# Patient Record
Sex: FEMALE | Race: WHITE | Employment: FULL TIME | URBAN - METROPOLITAN AREA
[De-identification: names, ages, dates, MRNs, and addresses within clinical notes are randomized per-mention and may not be internally consistent; named-entity substitution may affect disease eponyms.]

---

## 2019-06-05 ENCOUNTER — HOSPITAL ENCOUNTER (OUTPATIENT)
Dept: GENERAL RADIOLOGY | Age: 29
Discharge: HOME | End: 2019-06-05
Payer: COMMERCIAL

## 2019-06-05 DIAGNOSIS — R61 GENERALIZED HYPERHIDROSIS: ICD-10-CM

## 2019-06-05 DIAGNOSIS — R05.9 COUGH: ICD-10-CM

## 2019-06-05 PROCEDURE — 71046 X-RAY EXAM CHEST 2 VIEWS: CPT

## 2019-06-11 ENCOUNTER — HOSPITAL ENCOUNTER (EMERGENCY)
Age: 29
Discharge: HOME | End: 2019-06-11
Attending: EMERGENCY MEDICINE
Payer: COMMERCIAL

## 2019-06-11 VITALS
SYSTOLIC BLOOD PRESSURE: 134 MMHG | DIASTOLIC BLOOD PRESSURE: 93 MMHG | HEIGHT: 69 IN | BODY MASS INDEX: 29.62 KG/M2 | HEART RATE: 65 BPM | TEMPERATURE: 98 F | WEIGHT: 200 LBS | OXYGEN SATURATION: 98 % | RESPIRATION RATE: 16 BRPM

## 2019-06-11 DIAGNOSIS — R11.2 NON-INTRACTABLE VOMITING WITH NAUSEA, UNSPECIFIED VOMITING TYPE: Primary | ICD-10-CM

## 2019-06-11 LAB
ALBUMIN SERPL-MCNC: 4.1 G/DL (ref 3.4–5)
ALBUMIN/GLOB SERPL: 1.1 {RATIO} (ref 0.8–1.7)
ALP SERPL-CCNC: 55 U/L (ref 45–117)
ALT SERPL-CCNC: 22 U/L (ref 13–56)
AMORPH CRY URNS QL MICRO: ABNORMAL
ANION GAP SERPL CALC-SCNC: 6 MMOL/L (ref 3–18)
APPEARANCE UR: ABNORMAL
AST SERPL-CCNC: 28 U/L (ref 15–37)
BACTERIA URNS QL MICRO: ABNORMAL /HPF
BASOPHILS # BLD: 0 K/UL (ref 0–0.1)
BASOPHILS NFR BLD: 0 % (ref 0–2)
BILIRUB SERPL-MCNC: 1 MG/DL (ref 0.2–1)
BILIRUB UR QL: NEGATIVE
BUN SERPL-MCNC: 22 MG/DL (ref 7–18)
BUN/CREAT SERPL: 26 (ref 12–20)
CALCIUM SERPL-MCNC: 9.4 MG/DL (ref 8.5–10.1)
CHLORIDE SERPL-SCNC: 104 MMOL/L (ref 100–108)
CO2 SERPL-SCNC: 28 MMOL/L (ref 21–32)
COLOR UR: ABNORMAL
CREAT SERPL-MCNC: 0.85 MG/DL (ref 0.6–1.3)
DIFFERENTIAL METHOD BLD: ABNORMAL
EOSINOPHIL # BLD: 0.2 K/UL (ref 0–0.4)
EOSINOPHIL NFR BLD: 3 % (ref 0–5)
EPITH CASTS URNS QL MICRO: ABNORMAL /LPF (ref 0–5)
ERYTHROCYTE [DISTWIDTH] IN BLOOD BY AUTOMATED COUNT: 12.3 % (ref 11.6–14.5)
GLOBULIN SER CALC-MCNC: 3.7 G/DL (ref 2–4)
GLUCOSE SERPL-MCNC: 97 MG/DL (ref 74–99)
GLUCOSE UR STRIP.AUTO-MCNC: NEGATIVE MG/DL
HCG UR QL: NEGATIVE
HCT VFR BLD AUTO: 41.1 % (ref 35–45)
HGB BLD-MCNC: 13.8 G/DL (ref 12–16)
HGB UR QL STRIP: ABNORMAL
KETONES UR QL STRIP.AUTO: ABNORMAL MG/DL
LEUKOCYTE ESTERASE UR QL STRIP.AUTO: ABNORMAL
LIPASE SERPL-CCNC: 112 U/L (ref 73–393)
LYMPHOCYTES # BLD: 1.3 K/UL (ref 0.9–3.6)
LYMPHOCYTES NFR BLD: 17 % (ref 21–52)
MCH RBC QN AUTO: 30.5 PG (ref 24–34)
MCHC RBC AUTO-ENTMCNC: 33.6 G/DL (ref 31–37)
MCV RBC AUTO: 90.7 FL (ref 74–97)
MONOCYTES # BLD: 1 K/UL (ref 0.05–1.2)
MONOCYTES NFR BLD: 12 % (ref 3–10)
NEUTS SEG # BLD: 5.4 K/UL (ref 1.8–8)
NEUTS SEG NFR BLD: 68 % (ref 40–73)
NITRITE UR QL STRIP.AUTO: NEGATIVE
PH UR STRIP: 5.5 [PH] (ref 5–8)
PLATELET # BLD AUTO: 255 K/UL (ref 135–420)
PMV BLD AUTO: 10 FL (ref 9.2–11.8)
POTASSIUM SERPL-SCNC: 4.4 MMOL/L (ref 3.5–5.5)
PROT SERPL-MCNC: 7.8 G/DL (ref 6.4–8.2)
PROT UR STRIP-MCNC: ABNORMAL MG/DL
RBC # BLD AUTO: 4.53 M/UL (ref 4.2–5.3)
RBC #/AREA URNS HPF: ABNORMAL /HPF (ref 0–5)
SODIUM SERPL-SCNC: 138 MMOL/L (ref 136–145)
SP GR UR REFRACTOMETRY: >1.03 (ref 1–1.03)
UROBILINOGEN UR QL STRIP.AUTO: 1 EU/DL (ref 0.2–1)
WBC # BLD AUTO: 8 K/UL (ref 4.6–13.2)
WBC URNS QL MICRO: ABNORMAL /HPF (ref 0–4)

## 2019-06-11 PROCEDURE — 99283 EMERGENCY DEPT VISIT LOW MDM: CPT

## 2019-06-11 PROCEDURE — 74011250636 HC RX REV CODE- 250/636: Performed by: STUDENT IN AN ORGANIZED HEALTH CARE EDUCATION/TRAINING PROGRAM

## 2019-06-11 PROCEDURE — 81025 URINE PREGNANCY TEST: CPT

## 2019-06-11 PROCEDURE — 87086 URINE CULTURE/COLONY COUNT: CPT

## 2019-06-11 PROCEDURE — 80053 COMPREHEN METABOLIC PANEL: CPT

## 2019-06-11 PROCEDURE — 96361 HYDRATE IV INFUSION ADD-ON: CPT

## 2019-06-11 PROCEDURE — 74011250636 HC RX REV CODE- 250/636: Performed by: EMERGENCY MEDICINE

## 2019-06-11 PROCEDURE — 96374 THER/PROPH/DIAG INJ IV PUSH: CPT

## 2019-06-11 PROCEDURE — 85025 COMPLETE CBC W/AUTO DIFF WBC: CPT

## 2019-06-11 PROCEDURE — 81001 URINALYSIS AUTO W/SCOPE: CPT

## 2019-06-11 PROCEDURE — 83690 ASSAY OF LIPASE: CPT

## 2019-06-11 RX ORDER — ONDANSETRON 4 MG/1
4 TABLET, ORALLY DISINTEGRATING ORAL
Qty: 20 TAB | Refills: 0 | Status: SHIPPED | OUTPATIENT
Start: 2019-06-11

## 2019-06-11 RX ORDER — ONDANSETRON 2 MG/ML
4 INJECTION INTRAMUSCULAR; INTRAVENOUS ONCE
Status: COMPLETED | OUTPATIENT
Start: 2019-06-11 | End: 2019-06-11

## 2019-06-11 RX ORDER — OMEPRAZOLE 20 MG/1
20 CAPSULE, DELAYED RELEASE ORAL DAILY
COMMUNITY

## 2019-06-11 RX ADMIN — SODIUM CHLORIDE 1000 ML: 900 INJECTION, SOLUTION INTRAVENOUS at 04:51

## 2019-06-11 RX ADMIN — ONDANSETRON HYDROCHLORIDE 4 MG: 2 SOLUTION INTRAMUSCULAR; INTRAVENOUS at 04:51

## 2019-06-11 NOTE — ED NOTES
I have reviewed discharge instructions with the patient. The patient verbalized understanding. Discharge medications reviewed with patient and appropriate educational materials and side effects teaching were provided. Pt is AxOx4, NAD. Pt received written discharge instructions. Pt ambulated out of ED with steady gait.

## 2019-06-11 NOTE — DISCHARGE INSTRUCTIONS
Patient Education        Nausea and Vomiting: Care Instructions  Your Care Instructions    When you are nauseated, you may feel weak and sweaty and notice a lot of saliva in your mouth. Nausea often leads to vomiting. Most of the time you do not need to worry about nausea and vomiting, but they can be signs of other illnesses. Two common causes of nausea and vomiting are stomach flu and food poisoning. Nausea and vomiting from viral stomach flu will usually start to improve within 24 hours. Nausea and vomiting from food poisoning may last from 12 to 48 hours. The doctor has checked you carefully, but problems can develop later. If you notice any problems or new symptoms, get medical treatment right away. Follow-up care is a key part of your treatment and safety. Be sure to make and go to all appointments, and call your doctor if you are having problems. It's also a good idea to know your test results and keep a list of the medicines you take. How can you care for yourself at home? · To prevent dehydration, drink plenty of fluids, enough so that your urine is light yellow or clear like water. Choose water and other caffeine-free clear liquids until you feel better. If you have kidney, heart, or liver disease and have to limit fluids, talk with your doctor before you increase the amount of fluids you drink. · Rest in bed until you feel better. · When you are able to eat, try clear soups, mild foods, and liquids until all symptoms are gone for 12 to 48 hours. Other good choices include dry toast, crackers, cooked cereal, and gelatin dessert, such as Jell-O. When should you call for help? Call 911 anytime you think you may need emergency care. For example, call if:    · You passed out (lost consciousness).    Call your doctor now or seek immediate medical care if:    · You have symptoms of dehydration, such as:  ? Dry eyes and a dry mouth. ? Passing only a little dark urine. ?  Feeling thirstier than usual.   · You have new or worsening belly pain.     · You have a new or higher fever.     · You vomit blood or what looks like coffee grounds.    Watch closely for changes in your health, and be sure to contact your doctor if:    · You have ongoing nausea and vomiting.     · Your vomiting is getting worse.     · Your vomiting lasts longer than 2 days.     · You are not getting better as expected. Where can you learn more? Go to http://don-anton.info/. Enter 25 [id number] in the search box to learn more about \"Nausea and Vomiting: Care Instructions. \"  Current as of: September 23, 2018  Content Version: 11.9  © 2066-0154 [employer]. Care instructions adapted under license by [employer] (which disclaims liability or warranty for this information). If you have questions about a medical condition or this instruction, always ask your healthcare professional. [address] any warranty or liability for your use of this information.

## 2019-06-11 NOTE — ED TRIAGE NOTES
Pt states she has had abdominal pain N/V/D since Saturday, states she is trying to get in with a GI doctor but has been unsuccessful, states she has lost 8 pounds due to inabilty to keep food down.

## 2019-06-11 NOTE — ED PROVIDER NOTES
EMERGENCY DEPARTMENT HISTORY AND PHYSICAL EXAM    4:27 AM    Date: 6/11/2019  Patient Name: Carmelo Soliz    History of Presenting Illness     Chief Complaint   Patient presents with    Vomiting    Abdominal Pain    Diarrhea       History Provided By:     Additional History (Context): Carmelo Soliz is a 30 y.o. female with PMH of GERD recently seen by PCP for 1 month of diarrhea here with N/V and diarrhea since Saturday 3 days ago. Patient reports she has been unable to hold anything down - she denies fevers, blood in emesis, or rash. She has associated abd pain in her LUQ and epigastric area. She has not taken any medication for this N/V. She has tried immodium for the diarrhea with limited results. She denies active etoh or drug use, prior injectable drugs. She reports remote hx of STI when she was 14yo that was appropriately treated. She denies any vaginal discharge, bleeding, or lower pelvic pain. She denies any CP, SOB, wheezing. \  She denies current sexual activity - she has not had her period this month. She recently met her new PCP -7 days ago who recommended a GI referral.     PCP: Juan Jsoé Bahena NP    Current Facility-Administered Medications   Medication Dose Route Frequency Provider Last Rate Last Dose    sodium chloride 0.9 % bolus infusion 1,000 mL  1,000 mL IntraVENous ONCE Federico Paiz MD 1,000 mL/hr at 06/11/19 0451 1,000 mL at 06/11/19 0451     Current Outpatient Medications   Medication Sig Dispense Refill    omeprazole (PRILOSEC) 20 mg capsule Take 20 mg by mouth daily. Past History     Past Medical History:  Past Medical History:   Diagnosis Date    Endometriosis     GERD (gastroesophageal reflux disease)     Migraines     Tubal ligation evaluation 2012       Past Surgical History:  History reviewed. No pertinent surgical history. Family History:  History reviewed. No pertinent family history.     Social History:  Social History     Tobacco Use    Smoking status: Current Every Day Smoker     Packs/day: 0.50   Substance Use Topics    Alcohol use: Not Currently    Drug use: Not Currently       Allergies: Allergies   Allergen Reactions    Amoxicillin Angioedema    Keflex [Cephalexin] Rash    Compazine [Prochlorperazine] Anxiety    Geodon [Ziprasidone Hcl] Other (comments)     Dystonia           Review of Systems       Review of Systems   Constitutional: Positive for chills and diaphoresis. Negative for fatigue and fever. HENT: Negative for congestion and sinus pain. Respiratory: Negative for cough, chest tightness and shortness of breath. Cardiovascular: Negative for chest pain, palpitations and leg swelling. Gastrointestinal: Positive for abdominal pain, diarrhea, nausea and vomiting. Negative for blood in stool, constipation and rectal pain. Genitourinary: Positive for difficulty urinating (feels dehydrated). Negative for dysuria, flank pain, frequency, genital sores, menstrual problem and urgency. Musculoskeletal: Negative for back pain. Skin: Negative for color change, pallor and rash. Neurological: Negative for dizziness, weakness, light-headedness, numbness and headaches. Physical Exam     Visit Vitals  BP (!) 134/93   Pulse 65   Temp 98 °F (36.7 °C)   Resp 16   Ht 5' 9\" (1.753 m)   Wt 90.7 kg (200 lb)   SpO2 98%   BMI 29.53 kg/m²       Physical Exam   Constitutional: She is oriented to person, place, and time. She appears well-developed and well-nourished. No distress. HENT:   Head: Normocephalic and atraumatic. Mouth/Throat: No oropharyngeal exudate. Eyes: Pupils are equal, round, and reactive to light. EOM are normal. Right eye exhibits no discharge. Left eye exhibits no discharge. Neck: Normal range of motion. Cardiovascular: Normal rate, regular rhythm, normal heart sounds and intact distal pulses. Pulmonary/Chest: Effort normal and breath sounds normal. No respiratory distress. She has no wheezes.  She exhibits no tenderness. Abdominal: Soft. She exhibits distension (mild obesity). She exhibits no mass. There is tenderness (mild LUQ/Epigastric area). There is no rebound and no guarding. Musculoskeletal: Normal range of motion. She exhibits no edema or tenderness. Lymphadenopathy:     She has no cervical adenopathy. Neurological: She is alert and oriented to person, place, and time. Skin: Skin is warm and dry. No rash noted. She is not diaphoretic. No erythema. No pallor. Nursing note and vitals reviewed. Diagnostic Study Results     Labs -  Recent Results (from the past 12 hour(s))   URINALYSIS W/ RFLX MICROSCOPIC    Collection Time: 06/11/19  4:04 AM   Result Value Ref Range    Color DARK YELLOW      Appearance TURBID      Specific gravity >1.030 (H) 1.005 - 1.030    pH (UA) 5.5 5.0 - 8.0      Protein TRACE (A) NEG mg/dL    Glucose NEGATIVE  NEG mg/dL    Ketone TRACE (A) NEG mg/dL    Bilirubin NEGATIVE  NEG      Blood MODERATE (A) NEG      Urobilinogen 1.0 0.2 - 1.0 EU/dL    Nitrites NEGATIVE  NEG      Leukocyte Esterase MODERATE (A) NEG     HCG URINE, QL    Collection Time: 06/11/19  4:04 AM   Result Value Ref Range    HCG urine, QL NEGATIVE  NEG     CBC WITH AUTOMATED DIFF    Collection Time: 06/11/19  4:25 AM   Result Value Ref Range    WBC 8.0 4.6 - 13.2 K/uL    RBC 4.53 4.20 - 5.30 M/uL    HGB 13.8 12.0 - 16.0 g/dL    HCT 41.1 35.0 - 45.0 %    MCV 90.7 74.0 - 97.0 FL    MCH 30.5 24.0 - 34.0 PG    MCHC 33.6 31.0 - 37.0 g/dL    RDW 12.3 11.6 - 14.5 %    PLATELET 722 805 - 864 K/uL    MPV 10.0 9.2 - 11.8 FL    NEUTROPHILS 68 40 - 73 %    LYMPHOCYTES 17 (L) 21 - 52 %    MONOCYTES 12 (H) 3 - 10 %    EOSINOPHILS 3 0 - 5 %    BASOPHILS 0 0 - 2 %    ABS. NEUTROPHILS 5.4 1.8 - 8.0 K/UL    ABS. LYMPHOCYTES 1.3 0.9 - 3.6 K/UL    ABS. MONOCYTES 1.0 0.05 - 1.2 K/UL    ABS. EOSINOPHILS 0.2 0.0 - 0.4 K/UL    ABS.  BASOPHILS 0.0 0.0 - 0.1 K/UL    DF AUTOMATED     METABOLIC PANEL, COMPREHENSIVE    Collection Time: 06/11/19  4:25 AM   Result Value Ref Range    Sodium 138 136 - 145 mmol/L    Potassium 4.4 3.5 - 5.5 mmol/L    Chloride 104 100 - 108 mmol/L    CO2 28 21 - 32 mmol/L    Anion gap 6 3.0 - 18 mmol/L    Glucose 97 74 - 99 mg/dL    BUN 22 (H) 7.0 - 18 MG/DL    Creatinine 0.85 0.6 - 1.3 MG/DL    BUN/Creatinine ratio 26 (H) 12 - 20      GFR est AA >60 >60 ml/min/1.73m2    GFR est non-AA >60 >60 ml/min/1.73m2    Calcium 9.4 8.5 - 10.1 MG/DL    Bilirubin, total 1.0 0.2 - 1.0 MG/DL    ALT (SGPT) 22 13 - 56 U/L    AST (SGOT) 28 15 - 37 U/L    Alk. phosphatase 55 45 - 117 U/L    Protein, total 7.8 6.4 - 8.2 g/dL    Albumin 4.1 3.4 - 5.0 g/dL    Globulin 3.7 2.0 - 4.0 g/dL    A-G Ratio 1.1 0.8 - 1.7     LIPASE    Collection Time: 06/11/19  4:25 AM   Result Value Ref Range    Lipase 112 73 - 393 U/L       Radiologic Studies -   No orders to display         Medical Decision Making   I am the first provider for this patient. I reviewed the vital signs, available nursing notes, past medical history, past surgical history, family history and social history. Vital Signs-Reviewed the patient's vital signs.       ED Course: Progress Notes, Reevaluation, and Consults:  ED Course as of Jun 11 0538 Tue Jun 11, 2019   0440 Pulse (Heart Rate): 65 [GW]   0440 Not tachycardic, afebrile   Temp: 98 °F (36.7 °C) [GW]   0514 Does not suggest DKA   Anion gap: 6 [GW]   0515 Not elevated to suggest infection     WBC: 8.0 [GW]   0515 Moderate LE and trace ketones    [GW]   0515 Elevated to suggest pre-renal - likely dehydrated   BUN/Creatinine ratio(!): 26 [GW]   0516 With high SG suggest dehydration     Specific gravity(!): >1.030 [GW]   0516 LFTs, lipase do not suggest hepatobiliary, pancreatic cause of pain    [GW]   0534 Taking PO now and feels like the need to vomit is gone s/p zofran   -reporting now that her sister's house recently had GI bug  -low suspicion for more serious bacterial infection or IBD at this time given exam, vitals, hx [GW]      ED Course User Index  [GW] Ted Calderon MD     Patient feels better s/p fluids and meds and desires to go home. Will d/c with Rx for zofran. Provider Notes (Medical Decision Making):     Case discussed with attending physician, Dr Alysha Garrido    Diagnosis     Clinical Impression:   1. Non-intractable vomiting with nausea, unspecified vomiting type        Disposition: Home    Follow-up Information    None          Patient's Medications   Start Taking    No medications on file   Continue Taking    OMEPRAZOLE (PRILOSEC) 20 MG CAPSULE    Take 20 mg by mouth daily.    These Medications have changed    No medications on file   Stop Taking    No medications on file       Oneida Cole MD, PGY-2   [facility] - Emergency Medicine   June 11, 2019, 4:27 AM

## 2019-06-12 LAB
BACTERIA SPEC CULT: NORMAL
SERVICE CMNT-IMP: NORMAL

## 2020-08-16 ENCOUNTER — HOSPITAL ENCOUNTER (EMERGENCY)
Age: 30
Discharge: HOME | End: 2020-08-16
Attending: EMERGENCY MEDICINE
Payer: COMMERCIAL

## 2020-08-16 ENCOUNTER — APPOINTMENT (OUTPATIENT)
Dept: GENERAL RADIOLOGY | Age: 30
End: 2020-08-16
Attending: EMERGENCY MEDICINE
Payer: COMMERCIAL

## 2020-08-16 VITALS
HEIGHT: 69 IN | RESPIRATION RATE: 18 BRPM | HEART RATE: 76 BPM | SYSTOLIC BLOOD PRESSURE: 139 MMHG | WEIGHT: 235 LBS | BODY MASS INDEX: 34.8 KG/M2 | TEMPERATURE: 98.9 F | OXYGEN SATURATION: 99 % | DIASTOLIC BLOOD PRESSURE: 97 MMHG

## 2020-08-16 DIAGNOSIS — Z20.822 SUSPECTED COVID-19 VIRUS INFECTION: Primary | ICD-10-CM

## 2020-08-16 PROCEDURE — 87635 SARS-COV-2 COVID-19 AMP PRB: CPT

## 2020-08-16 PROCEDURE — 71045 X-RAY EXAM CHEST 1 VIEW: CPT

## 2020-08-16 PROCEDURE — 99283 EMERGENCY DEPT VISIT LOW MDM: CPT

## 2020-08-16 NOTE — LETTER
NOTIFICATION RETURN TO WORK / SCHOOL 
 
8/16/2020 4:27 AM 
 
Ms. Nury Justice [address] [facility] [id number] To Whom It May Concern: 
 
Nury Justice is currently under the care of [facility]. She will return to work/school on: 8/20/20 If there are questions or concerns please have the patient contact our office. Sincerely, MISTI Ferro

## 2020-08-16 NOTE — ED TRIAGE NOTES
Pt arrived with complaint of sinus pain and runny nose. States it feel like something is stuck in the back of her nose. PT also report sore throat with cough.

## 2020-08-16 NOTE — ED NOTES
2020 08:10 I have reviewed discharge instructions with the patient. The patient verbalized understanding. Patient armband removed and given to patient to take home.   Patient was informed of the privacy risks if armband lost or stolen

## 2020-08-16 NOTE — ED PROVIDER NOTES
Savanna Olvera is a 26 y.o. female with a past medical history of endometriosis coming in complaining of nasal congestion, nonproductive cough, sore throat, and fever. Patient states that symptoms are about 2 days ago. She states she had a fever up to 100.9 at home earlier tonight. Denies any significant shortness of breath, but does report fatigue and malaise. Also reports body aches and chills. Denies chest pain or hemoptysis. Denies leg swelling or history of DVT/PE. Patient denies known sick contacts. No other complaints this time. Past Medical History:   Diagnosis Date    Endometriosis     GERD (gastroesophageal reflux disease)     Migraines     Tubal ligation evaluation 2012       No past surgical history on file. No family history on file.     Social History     Socioeconomic History    Marital status: SINGLE     Spouse name: Not on file    Number of children: Not on file    Years of education: Not on file    Highest education level: Not on file   Occupational History    Not on file   Social Needs    Financial resource strain: Not on file    Food insecurity     Worry: Not on file     Inability: Not on file    Transportation needs     Medical: Not on file     Non-medical: Not on file   Tobacco Use    Smoking status: Current Every Day Smoker     Packs/day: 0.50   Substance and Sexual Activity    Alcohol use: Not Currently    Drug use: Not Currently    Sexual activity: Not on file   Lifestyle    Physical activity     Days per week: Not on file     Minutes per session: Not on file    Stress: Not on file   Relationships    Social connections     Talks on phone: Not on file     Gets together: Not on file     Attends [employer] service: Not on file     Active member of club or organization: Not on file     Attends meetings of clubs or organizations: Not on file     Relationship status: Not on file    Intimate partner violence     Fear of current or ex partner: Not on file Emotionally abused: Not on file     Physically abused: Not on file     Forced sexual activity: Not on file   Other Topics Concern    Not on file   Social History Narrative    Not on file         ALLERGIES: Amoxicillin; Keflex [cephalexin]; Compazine [prochlorperazine]; and Geodon [ziprasidone hcl]    Review of Systems   Constitutional: Positive for chills, fatigue and fever. Respiratory: Positive for cough. Negative for shortness of breath. Cardiovascular: Negative. Negative for chest pain and leg swelling. Gastrointestinal: Negative. Negative for abdominal pain, nausea and vomiting. Musculoskeletal: Positive for myalgias. Skin: Negative. Negative for rash. Neurological: Negative. Negative for dizziness, weakness and light-headedness. All other systems reviewed and are negative. Vitals:    08/16/20 0236   BP: 143/84   Pulse: (!) 107   Resp: 20   Temp: 98.9 °F (37.2 °C)   SpO2: 96%   Weight: 106.6 kg (235 lb)   Height: 5' 9\" (1.753 m)            Physical Exam  Vitals signs reviewed. Constitutional:       General: She is not in acute distress. Appearance: Normal appearance. She is well-developed. HENT:      Head: Normocephalic and atraumatic. Nose: Congestion present. Mouth/Throat:      Comments: Normal phonation, no drooling, handling secretions without difficulty. No hoarseness. Eyes:      Extraocular Movements: Extraocular movements intact. Conjunctiva/sclera: Conjunctivae normal.      Pupils: Pupils are equal, round, and reactive to light. Neck:      Musculoskeletal: Normal range of motion and neck supple. Cardiovascular:      Rate and Rhythm: Normal rate and regular rhythm. Heart sounds: S1 normal and S2 normal. No murmur. No friction rub. No gallop. Pulmonary:      Effort: Pulmonary effort is normal. No accessory muscle usage or respiratory distress. Breath sounds: Normal breath sounds.       Comments: Coughs frequently during exam.  Patient speaking in full sentences. Lungs clear to auscultation. No accessory muscle use or increased work of breathing. Abdominal:      General: There is no distension. Tenderness: There is no abdominal tenderness. Musculoskeletal: Normal range of motion. General: No tenderness. Skin:     General: Skin is warm. Findings: No rash. Neurological:      General: No focal deficit present. Mental Status: She is alert and oriented to person, place, and time. Psychiatric:         Speech: Speech normal.          MDM  Number of Diagnoses or Management Options  Suspected COVID-19 virus infection:   Diagnosis management comments: Slava Ponce is a 26 y.o. female coming in with fever, cough, myalgias, chills, and fatigue. Also with a sore throat nasal congestion. Differential diagnosis includes COVID-19, bronchitis, committee acquired pneumonia, viral URI, allergic rhinitis, among others. Will test for COVID and get a chest x-ray. Patient is very well-appearing and nontoxic. Lungs clear with good O2 sats and respiratory effort. We will plan on outpatient treatment with isolation at home. Patient was given return precautions for worsening symptoms, significant shortness of breath at rest, severe chest pain, or other concerns. Procedures      Vitals:  Patient Vitals for the past 12 hrs:   Temp Pulse Resp BP SpO2   08/16/20 0236 98.9 °F (37.2 °C) (!) 107 20 143/84 96 %       Disposition:  Diagnosis:   1.  Suspected COVID-19 virus infection        Disposition: Discharge    Follow-up Information     Follow up With Specialties Details Why Contact Info    Duke Dickson MD Family Medicine Schedule an appointment as soon as possible for a visit for office follow up [address] [place] [address] [address]      [facility] EM[facility] Emergency Medicine  If symptoms worsen [address] [id number] [phone]           Patient's Medications   Start Taking No medications on file   Continue Taking    OMEPRAZOLE (PRILOSEC) 20 MG CAPSULE    Take 20 mg by mouth daily. ONDANSETRON (ZOFRAN ODT) 4 MG DISINTEGRATING TABLET    Take 1 Tab by mouth every eight (8) hours as needed for Nausea.    These Medications have changed    No medications on file   Stop Taking    No medications on file

## 2020-08-17 LAB — SARS-COV-2, COV2NT: NOT DETECTED

## 2020-08-18 ENCOUNTER — HOSPITAL ENCOUNTER (EMERGENCY)
Age: 30
Discharge: HOME | End: 2020-08-18
Attending: EMERGENCY MEDICINE
Payer: COMMERCIAL

## 2020-08-18 ENCOUNTER — APPOINTMENT (OUTPATIENT)
Dept: GENERAL RADIOLOGY | Age: 30
End: 2020-08-18
Attending: PHYSICIAN ASSISTANT
Payer: COMMERCIAL

## 2020-08-18 VITALS
BODY MASS INDEX: 34.8 KG/M2 | RESPIRATION RATE: 18 BRPM | TEMPERATURE: 98.6 F | DIASTOLIC BLOOD PRESSURE: 79 MMHG | HEIGHT: 69 IN | OXYGEN SATURATION: 96 % | HEART RATE: 69 BPM | WEIGHT: 235 LBS | SYSTOLIC BLOOD PRESSURE: 118 MMHG

## 2020-08-18 DIAGNOSIS — J20.8 VIRAL BRONCHITIS: Primary | ICD-10-CM

## 2020-08-18 PROCEDURE — 71045 X-RAY EXAM CHEST 1 VIEW: CPT

## 2020-08-18 PROCEDURE — 99281 EMR DPT VST MAYX REQ PHY/QHP: CPT

## 2020-08-18 RX ORDER — ALBUTEROL SULFATE 1.25 MG/3ML
2.5 SOLUTION RESPIRATORY (INHALATION)
Qty: 25 EACH | Refills: 0 | Status: SHIPPED | OUTPATIENT
Start: 2020-08-18

## 2020-08-18 RX ORDER — BENZONATATE 100 MG/1
100 CAPSULE ORAL
Qty: 30 CAP | Refills: 0 | Status: SHIPPED | OUTPATIENT
Start: 2020-08-18 | End: 2020-08-25

## 2020-08-18 RX ORDER — PREDNISONE 10 MG/1
TABLET ORAL
Qty: 21 TAB | Refills: 0 | Status: SHIPPED | OUTPATIENT
Start: 2020-08-18

## 2020-08-18 NOTE — ED PROVIDER NOTES
EMERGENCY DEPARTMENT HISTORY AND PHYSICAL EXAM    Date: 8/18/2020  Patient Name: Preston Adams    History of Presenting Illness     Chief Complaint   Patient presents with    Cough    Chest Pain         History Provided By: patient     Chief Complaint: cough and chest tightness  Duration: few days  Timing:  acute  Location: chest  Quality:tightness  Severity:moderate  Modifying Factors: none   Associated Symptoms: chest soreness with coughing and deep inspiration       Additional History (Context): Preston Adams is a 26 y.o. female with PMH endometriosis, GERD, and migraine headaches who presents with c/o continued cough, chest tightness, and chest soreness with coughing x a few days. Pt was seen and tested for COVID 2 days ago, this resulted negative. No other complaints reported at this time. PCP: Flor Freeman MD    Current Outpatient Medications   Medication Sig Dispense Refill    albuterol (ACCUNEB) 1.25 mg/3 mL nebu Take 6 mL by inhalation every four (4) hours as needed for Wheezing (wheezing). 25 Each 0    predniSONE (STERAPRED DS) 10 mg dose pack Use as directed 21 Tab 0    benzonatate (Tessalon Perles) 100 mg capsule Take 1 Cap by mouth three (3) times daily as needed for Cough for up to 7 days. 30 Cap 0    omeprazole (PRILOSEC) 20 mg capsule Take 20 mg by mouth daily.  ondansetron (ZOFRAN ODT) 4 mg disintegrating tablet Take 1 Tab by mouth every eight (8) hours as needed for Nausea. 20 Tab 0       Past History     Past Medical History:  Past Medical History:   Diagnosis Date    Endometriosis     GERD (gastroesophageal reflux disease)     Migraines     Tubal ligation evaluation 2012       Past Surgical History:  No past surgical history on file. Family History:  No family history on file.     Social History:  Social History     Tobacco Use    Smoking status: Current Every Day Smoker     Packs/day: 0.50   Substance Use Topics    Alcohol use: Not Currently    Drug use: Not Currently       Allergies: Allergies   Allergen Reactions    Amoxicillin Angioedema    Keflex [Cephalexin] Rash    Compazine [Prochlorperazine] Anxiety    Geodon [Ziprasidone Hcl] Other (comments)     Dystonia           Review of Systems   Review of Systems   Constitutional: Negative. Negative for chills and fever. HENT: Negative. Negative for congestion, ear pain and rhinorrhea. Eyes: Negative. Negative for pain and redness. Respiratory: Positive for cough. Negative for shortness of breath, wheezing and stridor. Cardiovascular: Positive for chest pain. Negative for leg swelling. Gastrointestinal: Negative. Negative for abdominal pain, constipation, diarrhea, nausea and vomiting. Genitourinary: Negative. Negative for dysuria and frequency. Musculoskeletal: Negative. Negative for back pain and neck pain. Skin: Negative. Negative for rash and wound. Neurological: Negative. Negative for dizziness, seizures, syncope and headaches. All other systems reviewed and are negative. All Other Systems Negative  Physical Exam     Vitals:    08/18/20 1119   BP: 118/79   Pulse: 69   Resp: 18   Temp: 98.6 °F (37 °C)   SpO2: 96%   Weight: 106.6 kg (235 lb)   Height: 5' 9\" (1.753 m)     Physical Exam  Vitals signs and nursing note reviewed. Constitutional:       General: She is not in acute distress. Appearance: She is well-developed. She is not diaphoretic. HENT:      Head: Normocephalic and atraumatic. Mouth/Throat:      Mouth: Mucous membranes are moist.   Eyes:      General: No scleral icterus. Right eye: No discharge. Left eye: No discharge. Conjunctiva/sclera: Conjunctivae normal.   Neck:      Musculoskeletal: Normal range of motion and neck supple. Cardiovascular:      Rate and Rhythm: Normal rate and regular rhythm. Heart sounds: Normal heart sounds. No murmur. No friction rub. No gallop.     Pulmonary:      Effort: Pulmonary effort is normal. No respiratory distress. Breath sounds: Normal breath sounds. No stridor. No wheezing, rhonchi or rales. Comments: Mild midsternal chest TTP   Chest:      Chest wall: Tenderness present. Musculoskeletal: Normal range of motion. Skin:     General: Skin is warm and dry. Findings: No erythema or rash. Neurological:      Mental Status: She is alert and oriented to person, place, and time. Coordination: Coordination normal.      Comments: Gait is steady and patient exhibits no evidence of ataxia. Patient is able to ambulate without difficulty. No focal neurological deficit noted. No facial droop, slurred speech, or evidence of altered mentation noted on exam.     Psychiatric:         Behavior: Behavior normal.         Thought Content: Thought content normal.              Diagnostic Study Results     Labs -   No results found for this or any previous visit (from the past 12 hour(s)). Radiologic Studies -   XR CHEST PORT   Final Result   IMPRESSION:      NORMAL CHEST. CT Results  (Last 48 hours)    None        CXR Results  (Last 48 hours)               08/18/20 1155  XR CHEST PORT Final result    Impression:  IMPRESSION:       NORMAL CHEST. Narrative:  Portable Frontal Chest.       CLINICAL HISTORY: Cough with chest pain for 2 days. History of bronchitis. .       TECHNIQUE: Single frontal view of the chest, obtained portably. COMPARISONS: 8/16/2020. FINDINGS:        The lungs are clear. The cardiomediastinal silhouette is unremarkable. Pulmonary vascularity is normal.  There are no effusions. Medical Decision Making   I am the first provider for this patient. I reviewed the vital signs, available nursing notes, past medical history, past surgical history, family history and social history. Vital Signs-Reviewed the patient's vital signs.         Records Reviewed: Sabrina Garibay PA-C     Procedures:  Procedures    Provider Notes (Medical Decision Making): Impression:  Viral bronchitis    Chest x-ray negative, recent COVID testing 2 days ago negative, Clinical presentation suggestive of viral bronchitis, will plan to d/c with prednisone tessalon and albuterol with pcp follow-up. Pt agrees. Sabrina Garibay PA-C      MED RECONCILIATION:  No current facility-administered medications for this encounter. Current Outpatient Medications   Medication Sig    albuterol (ACCUNEB) 1.25 mg/3 mL nebu Take 6 mL by inhalation every four (4) hours as needed for Wheezing (wheezing).  predniSONE (STERAPRED DS) 10 mg dose pack Use as directed    benzonatate (Tessalon Perles) 100 mg capsule Take 1 Cap by mouth three (3) times daily as needed for Cough for up to 7 days.  omeprazole (PRILOSEC) 20 mg capsule Take 20 mg by mouth daily.  ondansetron (ZOFRAN ODT) 4 mg disintegrating tablet Take 1 Tab by mouth every eight (8) hours as needed for Nausea. Disposition:  D/c    DISCHARGE NOTE:   Patient is stable for discharge at this time. I have discussed all the findings from today's work up with the patient, including lab results and imaging. I have answered all questions. Rx for tessalon albuterol and prednisone given. Rest and close follow-up with the PCP recommended this week. Return to the ED immediately for any new or worsening symptoms. Sabrina Christopher PA-C     Follow-up Information     Follow up With Specialties Details Why Contact Info    Omayra Rodríguez MD Family Medicine In 1 week  [address] [place] [address]. Rosie Wallace [address] [place] [address] [id number] [phone]      [facility] EMERGENCY DEPT Emergency Medicine  As needed, If symptoms worsen [address] [facility] [address]  [phone]          Current Discharge Medication List      START taking these medications    Details   albuterol (ACCUNEB) 1.25 mg/3 mL nebu Take 6 mL by inhalation every four (4) hours as needed for Wheezing (wheezing).   Qty: 25 Each, Refills: 0      predniSONE (STERAPRED DS) 10 mg dose pack Use as directed  Qty: 21 Tab, Refills: 0      benzonatate (Tessalon Perles) 100 mg capsule Take 1 Cap by mouth three (3) times daily as needed for Cough for up to 7 days. Qty: 30 Cap, Refills: 0                   Diagnosis     Clinical Impression:   1.  Viral bronchitis

## 2020-08-18 NOTE — DISCHARGE INSTRUCTIONS
Patient Education        Bronchitis: Care Instructions  Your Care Instructions     Bronchitis is inflammation of the bronchial tubes, which carry air to the lungs. The tubes swell and produce mucus, or phlegm. The mucus and inflamed bronchial tubes make you cough. You may have trouble breathing. Most cases of bronchitis are caused by viruses like those that cause colds. Antibiotics usually do not help and they may be harmful. Bronchitis usually develops rapidly and lasts about 2 to 3 weeks in otherwise healthy people. Follow-up care is a key part of your treatment and safety. Be sure to make and go to all appointments, and call your doctor if you are having problems. It's also a good idea to know your test results and keep a list of the medicines you take. How can you care for yourself at home? · Take all medicines exactly as prescribed. Call your doctor if you think you are having a problem with your medicine. · Get some extra rest.  · Take an over-the-counter pain medicine, such as acetaminophen (Tylenol), ibuprofen (Advil, Motrin), or naproxen (Aleve) to reduce fever and relieve body aches. Read and follow all instructions on the label. · Do not take two or more pain medicines at the same time unless the doctor told you to. Many pain medicines have acetaminophen, which is Tylenol. Too much acetaminophen (Tylenol) can be harmful. · Take an over-the-counter cough medicine that contains dextromethorphan to help quiet a dry, hacking cough so that you can sleep. Avoid cough medicines that have more than one active ingredient. Read and follow all instructions on the label. · Breathe moist air from a humidifier, hot shower, or sink filled with hot water. The heat and moisture will thin mucus so you can cough it out. · Do not smoke. Smoking can make bronchitis worse. If you need help quitting, talk to your doctor about stop-smoking programs and medicines.  These can increase your chances of quitting for good.  When should you call for help? [id number] anytime you think you may need emergency care. For example, call if:  · You have severe trouble breathing. Call your doctor now or seek immediate medical care if:  · You have new or worse trouble breathing. · You cough up dark brown or bloody mucus (sputum). · You have a new or higher fever. · You have a new rash. Watch closely for changes in your health, and be sure to contact your doctor if:  · You cough more deeply or more often, especially if you notice more mucus or a change in the color of your mucus. · You are not getting better as expected. Where can you learn more? Go to http://don-anton.info/  Enter H333 in the search box to learn more about \"Bronchitis: Care Instructions. \"  Current as of: February 24, 2020               Content Version: 12.5  © 3010-7701 [employer]. Care instructions adapted under license by [employer] (which disclaims liability or warranty for this information). If you have questions about a medical condition or this instruction, always ask your healthcare professional. [address] any warranty or liability for your use of this information. [employer] Activation    Thank you for requesting access to [employer]. Please follow the instructions below to securely access and download your online medical record. [employer] allows you to send messages to your doctor, view your test results, renew your prescriptions, schedule appointments, and more. How Do I Sign Up? 1. In your internet browser, go to [email or web address]  2. Click on the First Time User? Click Here link in the Sign In box. You will be redirect to the New Member Sign Up page. 3. Enter your [employer] Access Code exactly as it appears below. You will not need to use this code after youve completed the sign-up process.  If you do not sign up before the expiration date, you must request a new code.    "[employer] Access Code: [email or web address]  Expires: 2020  2:34 AM (This is the date your "[employer] access code will )    4. Enter the last four digits of your Social Security Number (xxxx) and Date of Birth (mm/dd/yyyy) as indicated and click Submit. You will be taken to the next sign-up page. 5. Create a "[employer] ID. This will be your "[employer] login ID and cannot be changed, so think of one that is secure and easy to remember. 6. Create a "[employer] password. You can change your password at any time. 7. Enter your Password Reset Question and Answer. This can be used at a later time if you forget your password. 8. Enter your e-mail address. You will receive e-mail notification when new information is available in [address]. 9. Click Sign Up. You can now view and download portions of your medical record. 10. Click the Download Summary menu link to download a portable copy of your medical information. Additional Information    If you have questions, please visit the Frequently Asked Questions section of the "[employer] website at [email or web address] Remember, "[employer] is NOT to be used for urgent needs. For medical emergencies, dial 911.

## 2020-08-19 ENCOUNTER — PATIENT OUTREACH (OUTPATIENT)
Dept: CASE MANAGEMENT | Age: 30
End: 2020-08-19

## 2020-08-19 ENCOUNTER — HOSPITAL ENCOUNTER (EMERGENCY)
Age: 30
Discharge: HOME | End: 2020-08-19
Attending: EMERGENCY MEDICINE | Admitting: EMERGENCY MEDICINE
Payer: COMMERCIAL

## 2020-08-19 VITALS
SYSTOLIC BLOOD PRESSURE: 172 MMHG | BODY MASS INDEX: 34.8 KG/M2 | WEIGHT: 235 LBS | OXYGEN SATURATION: 97 % | DIASTOLIC BLOOD PRESSURE: 102 MMHG | HEART RATE: 93 BPM | HEIGHT: 69 IN | RESPIRATION RATE: 20 BRPM | TEMPERATURE: 99.3 F

## 2020-08-19 DIAGNOSIS — J20.9 ACUTE BRONCHITIS, UNSPECIFIED ORGANISM: Primary | ICD-10-CM

## 2020-08-19 PROCEDURE — 99282 EMERGENCY DEPT VISIT SF MDM: CPT

## 2020-08-19 RX ORDER — AZITHROMYCIN 250 MG/1
TABLET, FILM COATED ORAL
Qty: 6 TAB | Refills: 0 | Status: SHIPPED | OUTPATIENT
Start: 2020-08-19 | End: 2020-08-24

## 2020-08-19 NOTE — ED TRIAGE NOTES
Pt here for reevaluation of cough, fever & shortness of breath that began 5 days ago. Pt has been seen 2 times this week for same. COVID test done on 8/16/2020 was negative. Pt was previously diagnosed with viral bronchitis.

## 2020-08-19 NOTE — PROGRESS NOTES
Date/Time:  8/19/2020 1:30 PM  Attempted to reach patient by telephone. Left HIPPA compliant message requesting a return call. Will attempt to reach patient again.

## 2020-08-19 NOTE — ED PROVIDER NOTES
EMERGENCY DEPARTMENT HISTORY AND PHYSICAL EXAM    5:51 PM      Date: 8/19/2020  Patient Name: Sandhya Espinoza    History of Presenting Illness     Chief Complaint   Patient presents with    Cough    Shortness of Breath    Fever         History Provided By: Patient    Additional History (Context): Sandhya Espinoza is a 26 y.o. female with No significant past medical history who presents with cough and low-grade fever. Patient was thought to have COVID. She had a nasal test that was resulted negative. Patient now has mild chest pain from the coughing. She also has lost her voice from the coughing. She was placed on steroids and albuterol nebulizer with no improvement. Patient does smoke half a pack per day. She is only been able to smoke 3 to 4 cigarettes/day. She denies nausea, vomiting, or diarrhea. Patient states her temperature at home was 100.2. PCP: Veronica Izaguirre MD      Current Outpatient Medications   Medication Sig Dispense Refill    azithromycin (Zithromax Z-Mahesh) 250 mg tablet Take two tablets on day one then one tablet daily for the next 4 days. 6 Tab 0    albuterol (ACCUNEB) 1.25 mg/3 mL nebu Take 6 mL by inhalation every four (4) hours as needed for Wheezing (wheezing). 25 Each 0    predniSONE (STERAPRED DS) 10 mg dose pack Use as directed 21 Tab 0    benzonatate (Tessalon Perles) 100 mg capsule Take 1 Cap by mouth three (3) times daily as needed for Cough for up to 7 days. 30 Cap 0    omeprazole (PRILOSEC) 20 mg capsule Take 20 mg by mouth daily.  ondansetron (ZOFRAN ODT) 4 mg disintegrating tablet Take 1 Tab by mouth every eight (8) hours as needed for Nausea. 20 Tab 0       Past History     Past Medical History:  Past Medical History:   Diagnosis Date    Endometriosis     GERD (gastroesophageal reflux disease)     Migraines     Tubal ligation evaluation 2012       Past Surgical History:  History reviewed. No pertinent surgical history.     Family History:  History reviewed. No pertinent family history. Social History:  Social History     Tobacco Use    Smoking status: Current Every Day Smoker     Packs/day: 0.50   Substance Use Topics    Alcohol use: Not Currently    Drug use: Not Currently       Allergies: Allergies   Allergen Reactions    Amoxicillin Angioedema    Keflex [Cephalexin] Rash    Compazine [Prochlorperazine] Anxiety    Geodon [Ziprasidone Hcl] Other (comments)     Dystonia           Review of Systems       Review of Systems   Constitutional: Negative. Negative for chills, diaphoresis and fever. HENT: Positive for voice change. Negative for congestion, rhinorrhea and sore throat. Eyes: Negative. Negative for pain, discharge and redness. Respiratory: Positive for cough, chest tightness and shortness of breath. Negative for wheezing. Cardiovascular: Negative. Negative for chest pain. Gastrointestinal: Negative. Negative for abdominal pain, constipation, diarrhea, nausea and vomiting. Genitourinary: Negative. Negative for dysuria, flank pain, frequency, hematuria and urgency. Musculoskeletal: Negative. Negative for back pain and neck pain. Skin: Negative. Negative for rash. Neurological: Negative. Negative for syncope, weakness, numbness and headaches. Psychiatric/Behavioral: Negative. All other systems reviewed and are negative. Physical Exam     Visit Vitals  BP (!) 172/102 (BP 1 Location: Left arm, BP Patient Position: At rest)   Pulse 93   Temp 99.3 °F (37.4 °C)   Resp 20   Ht 5' 9\" (1.753 m)   Wt 106.6 kg (235 lb)   SpO2 97%   BMI 34.70 kg/m²         Physical Exam  Vitals signs and nursing note reviewed. Constitutional:       General: She is not in acute distress. Appearance: Normal appearance. She is well-developed. She is not ill-appearing, toxic-appearing or diaphoretic. HENT:      Head: Normocephalic and atraumatic. Mouth/Throat:      Pharynx: No oropharyngeal exudate.    Eyes:      General: No scleral icterus. Conjunctiva/sclera: Conjunctivae normal.      Pupils: Pupils are equal, round, and reactive to light. Neck:      Musculoskeletal: Normal range of motion and neck supple. Thyroid: No thyromegaly. Vascular: No hepatojugular reflux or JVD. Trachea: No tracheal deviation. Cardiovascular:      Rate and Rhythm: Normal rate and regular rhythm. Pulses: Normal pulses. Radial pulses are 2+ on the right side and 2+ on the left side. Dorsalis pedis pulses are 2+ on the right side and 2+ on the left side. Heart sounds: Normal heart sounds, S1 normal and S2 normal. No murmur. No gallop. No S3 or S4 sounds. Pulmonary:      Effort: Pulmonary effort is normal. No respiratory distress. Breath sounds: Normal breath sounds. No decreased breath sounds, wheezing, rhonchi or rales. Abdominal:      General: Bowel sounds are normal. There is no distension. Palpations: Abdomen is soft. Abdomen is not rigid. There is no mass. Tenderness: There is no abdominal tenderness. There is no guarding or rebound. Negative signs include Carter's sign and McBurney's sign. Musculoskeletal: Normal range of motion. Lymphadenopathy:      Head:      Right side of head: No submental, submandibular, preauricular or occipital adenopathy. Left side of head: No submental, submandibular, preauricular or occipital adenopathy. Cervical: No cervical adenopathy. Upper Body:      Right upper body: No supraclavicular adenopathy. Left upper body: No supraclavicular adenopathy. Skin:     General: Skin is warm and dry. Findings: No rash. Neurological:      Mental Status: She is alert. She is not disoriented. GCS: GCS eye subscore is 4. GCS verbal subscore is 5. GCS motor subscore is 6. Cranial Nerves: No cranial nerve deficit. Sensory: No sensory deficit.       Coordination: Coordination normal.      Gait: Gait normal.      Deep Tendon Reflexes: Reflexes are normal and symmetric. Psychiatric:         Speech: Speech normal.         Behavior: Behavior normal.         Thought Content: Thought content normal.         Judgment: Judgment normal.           Diagnostic Study Results     Labs -  No results found for this or any previous visit (from the past 12 hour(s)). Radiologic Studies -   No orders to display         Medical Decision Making   Provider Notes (Medical Decision Making):  MDM  Number of Diagnoses or Management Options  Acute bronchitis, unspecified organism:   Diagnosis management comments: Shortness of breath etiologies include chronic obstructive pulmonary disease (COPD), acute asthma exacerbation, congestive heart failure, pneumonia, acute bronchitis, pulmonary embolism, upper respiratory infection, cardiac event to include acute coronary syndrome, acute myocardial infarction or a combination of the above (ex URI on top of COPD thus causing respiratory distress). This patient is a 24-year-old  female who has had multiple medical visits for shortness of breath. Was thought to have COVID but was been negative. At this time her vitals are stable. Patient had been treated for viral bronchitis. I would like to add antibiotics to complete care. Patient instructed to return if no improvement with antibiotics and repeat COVID test.      I am the first provider for this patient. I reviewed the vital signs, available nursing notes, past medical history, past surgical history, family history and social history. Vital Signs-Reviewed the patient's vital signs. Records Reviewed: Nursing Notes (Time of Review: 5:51 PM)    ED Course: Progress Notes, Reevaluation, and Consults:    I will treat with a azithromycin. 5:51 PM 8/19/2020        Diagnosis       I have reassessed the patient. Patient will be prescribed Azithromycin. Patient was discharged in stable condition.   Patient is to return to emergency department if any new or worsening condition. Clinical Impression:   1. Acute bronchitis, unspecified organism        Disposition: Discharged home     Follow-up Information     Follow up With Specialties Details Why Contact Info    Maco Bell MD Family Medicine In 2 days  [address] [facility] [address] [place] [address]  [phone]               Attestation        Provider Attestation:     I personally performed the services described in the documentation, reviewed the documentation and it accurately and completely records my words and actions utilizing the [address] [place] August 19, 2020 at 5:51 PM - [place], [address]. It is dictated using utilizing voice recognition software. Unfortunately this leads to occasional typographical errors. I apologize in advance if the situation occurs. If questions arise please do not hesitate to contact me or call our department.

## 2020-08-19 NOTE — DISCHARGE INSTRUCTIONS
Patient Education        Bronchitis: Care Instructions  Your Care Instructions     Bronchitis is inflammation of the bronchial tubes, which carry air to the lungs. The tubes swell and produce mucus, or phlegm. The mucus and inflamed bronchial tubes make you cough. You may have trouble breathing. Most cases of bronchitis are caused by viruses like those that cause colds. Antibiotics usually do not help and they may be harmful. Bronchitis usually develops rapidly and lasts about 2 to 3 weeks in otherwise healthy people. Follow-up care is a key part of your treatment and safety. Be sure to make and go to all appointments, and call your doctor if you are having problems. It's also a good idea to know your test results and keep a list of the medicines you take. How can you care for yourself at home? · Take all medicines exactly as prescribed. Call your doctor if you think you are having a problem with your medicine. · Get some extra rest.  · Take an over-the-counter pain medicine, such as acetaminophen (Tylenol), ibuprofen (Advil, Motrin), or naproxen (Aleve) to reduce fever and relieve body aches. Read and follow all instructions on the label. · Do not take two or more pain medicines at the same time unless the doctor told you to. Many pain medicines have acetaminophen, which is Tylenol. Too much acetaminophen (Tylenol) can be harmful. · Take an over-the-counter cough medicine that contains dextromethorphan to help quiet a dry, hacking cough so that you can sleep. Avoid cough medicines that have more than one active ingredient. Read and follow all instructions on the label. · Breathe moist air from a humidifier, hot shower, or sink filled with hot water. The heat and moisture will thin mucus so you can cough it out. · Do not smoke. Smoking can make bronchitis worse. If you need help quitting, talk to your doctor about stop-smoking programs and medicines.  These can increase your chances of quitting for good.  When should you call for help? [id number] anytime you think you may need emergency care. For example, call if:  · You have severe trouble breathing. Call your doctor now or seek immediate medical care if:  · You have new or worse trouble breathing. · You cough up dark brown or bloody mucus (sputum). · You have a new or higher fever. · You have a new rash. Watch closely for changes in your health, and be sure to contact your doctor if:  · You cough more deeply or more often, especially if you notice more mucus or a change in the color of your mucus. · You are not getting better as expected. Where can you learn more? Go to http://don-anton.info/  Enter H333 in the search box to learn more about \"Bronchitis: Care Instructions. \"  Current as of: February 24, 2020               Content Version: 12.5  © 7330-7480 Healthwise, Incorporated. Care instructions adapted under license by [employer] (which disclaims liability or warranty for this information). If you have questions about a medical condition or this instruction, always ask your healthcare professional. [address] any warranty or liability for your use of this information.

## 2020-08-19 NOTE — LETTER
NOTIFICATION RETURN TO WORK / SCHOOL 
 
8/19/2020 5:59 PM 
 
Ms. Dania Parker [address] [facility] [address] To Whom It May Concern: 
 
Dania Parker is currently under the care of [id number] [facility] EMERGENCY DEPT. She will return to work/school on: 8/22/2020 If there are questions or concerns please have the patient contact our office. Sincerely, 
 
 
 
 
Dr. Bijan Arellano

## 2020-08-19 NOTE — PROGRESS NOTES
Patient contacted regarding COVID-19  risk. Discussed COVID-19 related testing which was available at this time. Test results were negative. Patient informed of results, if available? yes     Care Transition Nurse/ Ambulatory Care Manager/ LPN Care Coordinator contacted the patient by telephone to perform post discharge assessment. Verified name and  with patient as identifiers. Provided introduction to self, and explanation of the CTN/ACM/LPN role, and reason for call due to risk factors for infection and/or exposure to COVID-19. Symptoms reviewed with patient who verbalized the following symptoms: fever, cough, shortness of breath and sweating. Due to no new or worsening symptoms encounter was not routed to provider for escalation. Discussed follow-up appointments. If no appointment was previously scheduled, appointment scheduling offered: yes  [facility] follow up appointment(s): No future appointments. Non-[facility] follow up appointment(s): NA      Advance Care Planning:   Does patient have an Advance Directive: not on file     Patient has following risk factors of: no known risk factors. CTN/ACM/LPN reviewed discharge instructions, medical action plan and red flags such as increased shortness of breath, increasing fever and signs of decompensation with patient who verbalized understanding. Discussed exposure protocols and quarantine with CDC Guidelines What to do if you are sick with coronavirus disease 2019.  Patient was given an opportunity for questions and concerns. The patient agrees to contact the [facility] exposure line [phone], [facility], [facility] [address]  ([phone]) and PCP office for questions related to their healthcare. CTN/ACM provided contact information for future needs. Reviewed and educated patient on any new and changed medications related to discharge diagnosis.     Patient/family/caregiver given information for [employer] and agrees to enroll yes  Patient's preferred e-mail:  [email or web address]  Patient's preferred phone number: [phone]  Based on Loop alert triggers, patient will be contacted by nurse care manager for worsening symptoms. Pt will be further monitored by COVID Loop Team based on severity of symptoms and risk factors.

## 2020-08-20 ENCOUNTER — PATIENT OUTREACH (OUTPATIENT)
Dept: CASE MANAGEMENT | Age: 30
End: 2020-08-20

## 2020-08-20 NOTE — PROGRESS NOTES
Patient contacted regarding COVID-19  risk. Discussed COVID-19 related testing which was not done at this time. Test results were not done. Patient informed of results, if available? Patient aware of negative results from 20 test     Care Transition Nurse/ Ambulatory Care Manager/ LPN Care Coordinator contacted the patient by telephone to perform post discharge assessment. Verified name and  with patient as identifiers. Provided introduction to self, and explanation of the CTN/ACM/LPN role, and reason for call due to risk factors for infection and/or exposure to COVID-19. Symptoms reviewed with patient who verbalized the following symptoms: fatigue, cough, shortness of breath, sweating and chest pain. Due to patient states that symptoms have not changed but she feels \"a little better\" encounter was not routed to provider for escalation. Discussed follow-up appointments. If no appointment was previously scheduled, appointment scheduling offered: yes  [facility] follow up appointment(s): No future appointments. Non-[facility] follow up appointment(s): NA      Advance Care Planning:   Does patient have an Advance Directive: not on file     Patient has following risk factors of: no known risk factors. CTN/ACM/LPN reviewed discharge instructions, medical action plan and red flags such as increased shortness of breath, increasing fever and signs of decompensation with patient who verbalized understanding. Discussed exposure protocols and quarantine with CDC Guidelines What to do if you are sick with coronavirus disease .  Patient was given an opportunity for questions and concerns. The patient agrees to contact the [facility] exposure line [phone], [facility] department [facility] [address]  ([phone]) and PCP office for questions related to their healthcare. CTN/ACM provided contact information for future needs.     Reviewed and educated patient on any new and changed medications related to discharge diagnosis. Patient/family/caregiver given information for [employer] and agrees to enroll yes  Patient's preferred e-mail:  [email or web address]  Patient's preferred phone number: [phone]  Based on Loop alert triggers, patient will be contacted by nurse care manager for worsening symptoms. Pt will be further monitored by COVID Loop Team based on severity of symptoms and risk factors.

## 2020-08-20 NOTE — PROGRESS NOTES
Patient states her breathing is somewhat better after a breathing treatment. Has started on the meds given to her at [facility] yesterday. COVID -19 test of 20 negative. States she thinks her fever has broken during the night, treating with Tylenol. Date/Time:  2020 8:31 AM    Patient contacted regarding Loop Alert received. Verified patients name and  as identifiers. RN contacted the patient by telephone regarding red Loop alert. Patient reported the following symptoms: fever, fatigue, cough, shortness of breath, sweating, no new symptoms and no worsening symptoms. Due to continued symptoms, patient was advised to self monitor symptoms at home. Due to no new or worsening symptoms encounter was not routed to provider for escalation. Education provided regarding infection prevention, and signs and symptoms of COVID-19 and when to seek medical attention with patient who verbalized understanding. Discussed exposure protocols and quarantine from [address] you at higher risk for severe illness  and given an opportunity for questions and concerns. The patient agrees to contact agrees to contact their provider, COVID-19 hotline [phone], or [facility] 234 beds at the [facility] [address]  [phone] for questions related to their healthcare. Author provided contact information for future needs. From CDC: Are you at higher risk for severe illness?  Wash your hands often.  Avoid close contact (6 feet, which is about two arm lengths) with people who are sick.  Put distance between yourself and other people if COVID-19 is spreading in your community.  Clean and disinfect frequently touched surfaces.  Avoid all cruise travel and non-essential air travel.  Call your healthcare professional if you have concerns about COVID-19 and your underlying condition or if you are sick.     For more information on steps you can take to protect yourself, see CDC's How to Protect Yourself      Patient will continue to self report symptoms using Loop self monitoring. Patient has RN's contact information.

## 2020-08-21 ENCOUNTER — PATIENT OUTREACH (OUTPATIENT)
Dept: CASE MANAGEMENT | Age: 30
End: 2020-08-21

## 2020-08-21 ENCOUNTER — OFFICE VISIT (OUTPATIENT)
Dept: FAMILY MEDICINE CLINIC | Facility: CLINIC | Age: 30
End: 2020-08-21

## 2020-08-21 ENCOUNTER — HOSPITAL ENCOUNTER (OUTPATIENT)
Dept: LAB | Age: 30
Discharge: HOME | End: 2020-08-21
Payer: COMMERCIAL

## 2020-08-21 VITALS
TEMPERATURE: 98.6 F | OXYGEN SATURATION: 98 % | HEART RATE: 61 BPM | SYSTOLIC BLOOD PRESSURE: 171 MMHG | DIASTOLIC BLOOD PRESSURE: 100 MMHG | RESPIRATION RATE: 16 BRPM

## 2020-08-21 DIAGNOSIS — R03.0 ELEVATED BLOOD PRESSURE READING: ICD-10-CM

## 2020-08-21 DIAGNOSIS — J02.9 SORE THROAT: ICD-10-CM

## 2020-08-21 DIAGNOSIS — R06.02 SOB (SHORTNESS OF BREATH): ICD-10-CM

## 2020-08-21 DIAGNOSIS — R50.9 FEVER, UNKNOWN ORIGIN: ICD-10-CM

## 2020-08-21 DIAGNOSIS — F17.200 SMOKER: ICD-10-CM

## 2020-08-21 DIAGNOSIS — J20.9 ACUTE BRONCHITIS, UNSPECIFIED ORGANISM: Primary | ICD-10-CM

## 2020-08-21 LAB
S PYO AG THROAT QL: NEGATIVE
VALID INTERNAL CONTROL?: YES

## 2020-08-21 PROCEDURE — 87635 SARS-COV-2 COVID-19 AMP PRB: CPT

## 2020-08-21 RX ORDER — ALBUTEROL SULFATE 90 UG/1
1 AEROSOL, METERED RESPIRATORY (INHALATION)
Qty: 1 INHALER | Refills: 0 | Status: SHIPPED | OUTPATIENT
Start: 2020-08-21

## 2020-08-21 RX ORDER — CLINDAMYCIN HYDROCHLORIDE 300 MG/1
300 CAPSULE ORAL 3 TIMES DAILY
Qty: 30 CAP | Refills: 0 | Status: SHIPPED | OUTPATIENT
Start: 2020-08-21 | End: 2020-08-31

## 2020-08-21 RX ORDER — METHYLPREDNISOLONE 4 MG/1
TABLET ORAL
Qty: 1 DOSE PACK | Refills: 0 | Status: SHIPPED | OUTPATIENT
Start: 2020-08-21

## 2020-08-21 NOTE — PROGRESS NOTES
SUBJECTIVE:  Chief Complaint   Patient presents with    Cough    Diarrhea    Shortness of Breath    Covid Testing     previous test neg 8/16    Sore Throat     pt is hoarse     Patient denies of fever. Patient denies recent travel. Pt exposed to sick contacts under investigations for possible Covid UNKNOWN. Patient works in housekeeping in 2 different hotels. Pt is a current smoker. She states she has not smoked a cigarette in 3 days. OBJECTIVE    Visit Vitals  BP (!) 171/100 (BP 1 Location: Left arm, BP Patient Position: Sitting)   Pulse 61   Temp 98.6 °F (37 °C) (Oral)   Resp 16   LMP  (LMP Unknown)   SpO2 98%      General:  well nourished, cooperative, in no apparent distress and in mild distress. Sick but not toxic appearing. Eyes:   The lids are without swelling, lesions, or drainage. The conjunctiva is clear and noninjected. ENT:  ENT exam normal, no neck nodes or sinus tenderness and throat normal without erythema or exudate. Neck: normal and no adenopathy. Lungs/CV: unlabored breathing and expiratory wheezes and rhonchi throughout both lung fields. Heart: regular rhythm normal rate. Noted elevated blood pressure. Patient states she does not have hypertension but since she has been sick her blood pressure has been elevated. Skin:  No rashes, no jaundice. ASSESSMENT / PLAN     ICD-10-CM ICD-9-CM    1. Acute bronchitis, unspecified organism  J20.9 466.0 clindamycin (CLEOCIN) 300 mg capsule      methylPREDNISolone (Medrol, Mahesh,) 4 mg tablet      albuterol (PROVENTIL HFA, VENTOLIN HFA, PROAIR HFA) 90 mcg/actuation inhaler   2. Fever, unknown origin  R50.9 780.60 AMB POC RAPID STREP A      NOVEL CORONAVIRUS (COVID-19)   3. Sore throat  J02.9 462 AMB POC RAPID STREP A      NOVEL CORONAVIRUS (COVID-19)   4.  SOB (shortness of breath)  R06.02 786.05 NOVEL CORONAVIRUS (COVID-19)      methylPREDNISolone (Medrol, Mahesh,) 4 mg tablet      albuterol (PROVENTIL HFA, VENTOLIN HFA, PROAIR HFA) 90 mcg/actuation inhaler   5. Elevated blood pressure reading  R03.0 796.2    6. Smoker  F17.200 305.1 albuterol (PROVENTIL HFA, VENTOLIN HFA, PROAIR HFA) 90 mcg/actuation inhaler       Negative for strep. Based on CDC recommendations and limited testing supplies, only those patients who meet criteria will be tested for Covid. High priority groups for testing   Symptomatic and/or Exposure /Test for Covid  Immunocompromised host (on prednisone, biological therapy, blood cancer, metastatic cancer or active chemotherapy)   [facility] worker in the home    Other high-risk group: o age >47   o Uncontrolled DM   o Uncontrolled HTN   o BMI >40, CKD/ESRD    Dialysis patients (patients going to HD units, not asymptomatic home HD/PD)    Anyone living in a congregate setting     Non High-risk patient category           Test for COVID-19   Asymptomatic, no known exposure  No    Asymptomatic, possible exposure  No    Asymptomatic, definite exposure  Provider discretion    Symptomatic, no known exposure  Yes    Symptomatic, + exposure  Yes      Patient does  meet criteria for Covid testing. COVID-19 testing was completed. Work note was given until [employer] are available. If Covid testing was completed and is negative, patient may return back to work despite quarantine originally set in place if applicable. Awaiting Covid 19 results at this time. Instructed pt on the importance of rest, fluid intake (with avoidance of red fluids), and vit C supplements. Instructed pt to check temp if possible and to take acetaminophen or NSAIDs if fevers are noted. Instructed patient to remember to wash hands, disinfect surroundings, and avoid touching face. Instructed pt to remain home and and self quarantine until Covid results are negative and all symptoms have improved or subsided. If they must leave home, wear a mask. Patient verbalized understanding.     We have provided the patient with a detailed after visit summary which was reviewed, and red flag symptoms that would warrant an ER visit were emphasized. CC'd chart to PCP: No PCP in [facility]. Patient was seen in the ED on 8/16/2020 with complaints of nasal congestion dry cough sore throat and fever. Patient was tested for COVID at that time, her results were negative. Patient was seen again in the ED on 8/18/2020 with complaints of continued dry cough, chest tightness, chest soreness with coughing. Patient was diagnosed with viral bronchitis and started on AccuNeb, albuterol, prednisone, Tessalon. Chest x-ray was normal    Again patient was seen in the ER on 8/19/2020 with complaints of cough, shortness of breath, fever. At this time her Covid test had come back as negative. She was also started on azithromycin. She arrives today at the [facility] because her PCP refused to see her due to her symptoms. Patient does appear sick. She was coughing pretty severely during the assessment. No shortness of breath noted and patient was able to speak in full sentences between her coughing fits. Due to her ongoing symptoms despite a negative COVID test and taking DuoNeb, Tessalon, antibiotics, will retest for COVID. Instructed patient to DC the prednisone she has remaining and her azithromycin. Instructed patient to hold off using the DuoNeb until we get back a negative COVID test as a duo neb could spread the virus if she is now infected. Patient verbalized understanding. Patient is unable to penicillin and was recently on azithromycin which did not seem to be effectiveso clindamycin (patient states she is able to take this medication) was prescribed along with a Medrol Dosepak and a albuterol inhaler. Instructed patient if her COVID test comes back as negative, she will need to follow-up with her PCP. I did suggest that since she is a smoker she should inquire with her PCP about a PFT.       Dr. Wander Tim, ROXANAP-C, DNP      This visit was provided as a focused evaluation during the COVID -19 pandemic/national emergency. A comprehensive review of all previous patient history and testing was not conducted. Pertinent findings were elicited during the visit.

## 2020-08-21 NOTE — LETTER
NOTIFICATION RETURN TO WORK / SCHOOL 
 
8/21/2020 9:47 AM 
 
Ms. Candy White [address] [facility] [address] To Whom It May Concern: 
 
Candy White is currently under the care of [address] and is to remain on quarantine until test results return and indicate that Covid-19 is \"undetected\". She will return to work/school on: 8/28/2020 If there are questions or concerns please have the patient contact our office. Sincerely, Simba Cotton, DNP

## 2020-08-21 NOTE — PROGRESS NOTES
Daymac Drought presents today for   Chief Complaint   Patient presents with    Cough    Diarrhea    Shortness of Breath    Covid Testing     previous test neg 8/16    Sore Throat     pt is hoarse       Is someone accompanying this pt? no    Is the patient using any DME equipment during OV? no    Travel and Exposure Screening was performed during check in or rooming process Yes  Yes: Comment: pt hasnt traveled. Depression Screening:  No flowsheet data found. Fall Risk  No flowsheet data found.     This Visit Test  Results for orders placed or performed in visit on 08/21/20   AMB POC RAPID STREP A   Result Value Ref Range    VALID INTERNAL CONTROL POC Yes     Group A Strep Ag Negative Negative

## 2020-08-21 NOTE — PROGRESS NOTES
Patient en route to 0920 appointment with PCP. Date/Time:  2020 8:32 AM    Patient contacted regarding Loop Alert received. Verified patients name and  as identifiers. RN contacted the patient by telephone regarding red Loop alert. Patient reported the following symptoms: fever, fatigue, cough and shortness of breath. Due to continued symptoms, patient was advised to contact provider. Due to no new or worsening symptoms encounter was not routed to provider for escalation. Education provided regarding infection prevention, and signs and symptoms of COVID-19 and when to seek medical attention with patient who verbalized understanding. Discussed exposure protocols and quarantine from [address] you at higher risk for severe illness  and given an opportunity for questions and concerns. The patient agrees to contact agrees to contact their provider, COVID-19 hotline [phone], or [facility] [address]  ([phone] for questions related to their healthcare. Author provided contact information for future needs. From CDC: Are you at higher risk for severe illness?  Wash your hands often.  Avoid close contact (6 feet, which is about two arm lengths) with people who are sick.  Put distance between yourself and other people if COVID-19 is spreading in your community.  Clean and disinfect frequently touched surfaces.  Avoid all cruise travel and non-essential air travel.  Call your healthcare professional if you have concerns about COVID-19 and your underlying condition or if you are sick. For more information on steps you can take to protect yourself, see CDC's How to Protect Yourself      Patient will continue to self report symptoms using Loop self monitoring. Patient has RN's contact information.

## 2020-08-22 ENCOUNTER — PATIENT OUTREACH (OUTPATIENT)
Dept: CASE MANAGEMENT | Age: 30
End: 2020-08-22

## 2020-08-22 NOTE — PROGRESS NOTES
Date/Time:  2020 9:30 AM    Patient contacted regarding Loop Alert received. Verified patients name and  as identifiers. RN contacted the patient by telephone regarding yellow Loop alert. Patient reported the following symptoms: fever, fatigue, cough and shortness of breath. Due to continued symptoms, patient was advised to self monitor symptoms at home. Due to no new or worsening symptoms encounter was not routed to provider for escalation. Education provided regarding infection prevention, and signs and symptoms of COVID-19 and when to seek medical attention with patient who verbalized understanding. Discussed exposure protocols and quarantine from [address] you at higher risk for severe illness  and given an opportunity for questions and concerns. The patient agrees to contact agrees to contact their provider, COVID-19 hotline [phone], or [facility] [address]  ([phone] for questions related to their healthcare. Author provided contact information for future needs. From CDC: Are you at higher risk for severe illness?  Wash your hands often.  Avoid close contact (6 feet, which is about two arm lengths) with people who are sick.  Put distance between yourself and other people if COVID-19 is spreading in your community.  Clean and disinfect frequently touched surfaces.  Avoid all cruise travel and non-essential air travel.  Call your healthcare professional if you have concerns about COVID-19 and your underlying condition or if you are sick. For more information on steps you can take to protect yourself, see CDC's How to Protect Yourself      Patient will continue to self report symptoms using Loop self monitoring. Patient has RN's contact information.

## 2020-08-23 LAB — SARS-COV-2, COV2NT: NOT DETECTED

## 2020-08-24 ENCOUNTER — TELEPHONE (OUTPATIENT)
Dept: FAMILY MEDICINE CLINIC | Facility: CLINIC | Age: 30
End: 2020-08-24

## 2020-08-30 ENCOUNTER — PATIENT OUTREACH (OUTPATIENT)
Dept: CASE MANAGEMENT | Age: 30
End: 2020-08-30

## 2020-08-30 NOTE — PROGRESS NOTES
Yellow alert noted in remote symptom monitoring program. Messaged patient to notify Anna Shah if symptoms have worsened since yesterday or if they would like to have a nurse reach out. yes

## 2023-05-19 RX ORDER — METHYLPREDNISOLONE 4 MG/1
TABLET ORAL
COMMUNITY
Start: 2020-08-21

## 2023-05-19 RX ORDER — ALBUTEROL SULFATE 1.25 MG/3ML
2.5 SOLUTION RESPIRATORY (INHALATION) EVERY 4 HOURS PRN
COMMUNITY
Start: 2020-08-18

## 2023-05-19 RX ORDER — OMEPRAZOLE 20 MG/1
20 CAPSULE, DELAYED RELEASE ORAL DAILY
COMMUNITY

## 2023-05-19 RX ORDER — ONDANSETRON 4 MG/1
4 TABLET, ORALLY DISINTEGRATING ORAL EVERY 8 HOURS PRN
COMMUNITY
Start: 2019-06-11

## 2023-05-19 RX ORDER — PREDNISONE 10 MG/1
TABLET ORAL
COMMUNITY
Start: 2020-08-18

## 2023-05-19 RX ORDER — ALBUTEROL SULFATE 90 UG/1
1 AEROSOL, METERED RESPIRATORY (INHALATION) EVERY 4 HOURS PRN
COMMUNITY
Start: 2020-08-21

## 2023-10-24 NOTE — PROGRESS NOTES
[facility] clinic nurses: Please notify pt their Covid test was Negative. If patients symptoms have not improved, they need to follow-up with their PCP. Remind pt to stay home but if they must leave home, take all precautions: wear a mask, continue social distancing, disinfect home/work areas, avoid touching the face, and sanitize hands frequently. If they need a return to work note, please provide. Thank you. ADMIT